# Patient Record
Sex: MALE | Race: BLACK OR AFRICAN AMERICAN | NOT HISPANIC OR LATINO | URBAN - METROPOLITAN AREA
[De-identification: names, ages, dates, MRNs, and addresses within clinical notes are randomized per-mention and may not be internally consistent; named-entity substitution may affect disease eponyms.]

---

## 2018-08-28 ENCOUNTER — EMERGENCY (EMERGENCY)
Facility: HOSPITAL | Age: 63
LOS: 0 days | Discharge: HOME | End: 2018-08-28
Attending: EMERGENCY MEDICINE | Admitting: EMERGENCY MEDICINE

## 2018-08-28 VITALS
RESPIRATION RATE: 18 BRPM | SYSTOLIC BLOOD PRESSURE: 141 MMHG | DIASTOLIC BLOOD PRESSURE: 90 MMHG | HEART RATE: 43 BPM | OXYGEN SATURATION: 98 %

## 2018-08-28 VITALS
OXYGEN SATURATION: 98 % | DIASTOLIC BLOOD PRESSURE: 97 MMHG | SYSTOLIC BLOOD PRESSURE: 174 MMHG | RESPIRATION RATE: 20 BRPM | HEART RATE: 56 BPM

## 2018-08-28 DIAGNOSIS — Z23 ENCOUNTER FOR IMMUNIZATION: ICD-10-CM
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
## 2018-08-28 DIAGNOSIS — S61.512A LACERATION WITHOUT FOREIGN BODY OF LEFT WRIST, INITIAL ENCOUNTER: ICD-10-CM
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
## 2018-08-28 DIAGNOSIS — S61.522A LACERATION WITH FOREIGN BODY OF LEFT WRIST, INITIAL ENCOUNTER: ICD-10-CM
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
## 2018-08-28 LAB
ALBUMIN SERPL ELPH-MCNC: 4.5 G/DL — SIGNIFICANT CHANGE UP (ref 3.5–5.2)
ALP SERPL-CCNC: 67 U/L — SIGNIFICANT CHANGE UP (ref 30–115)
ALT FLD-CCNC: 11 U/L — SIGNIFICANT CHANGE UP (ref 0–41)
ANION GAP SERPL CALC-SCNC: 16 MMOL/L — HIGH (ref 7–14)
APTT BLD: 37.2 SEC — SIGNIFICANT CHANGE UP (ref 27–39.2)
AST SERPL-CCNC: 18 U/L — SIGNIFICANT CHANGE UP (ref 0–41)
BASOPHILS # BLD AUTO: 0.07 K/UL — SIGNIFICANT CHANGE UP (ref 0–0.2)
BASOPHILS NFR BLD AUTO: 1.1 % — HIGH (ref 0–1)
BILIRUB SERPL-MCNC: 0.3 MG/DL — SIGNIFICANT CHANGE UP (ref 0.2–1.2)
BUN SERPL-MCNC: 18 MG/DL — SIGNIFICANT CHANGE UP (ref 10–20)
CALCIUM SERPL-MCNC: 9.4 MG/DL — SIGNIFICANT CHANGE UP (ref 8.5–10.1)
CHLORIDE SERPL-SCNC: 99 MMOL/L — SIGNIFICANT CHANGE UP (ref 98–110)
CO2 SERPL-SCNC: 25 MMOL/L — SIGNIFICANT CHANGE UP (ref 17–32)
CREAT SERPL-MCNC: 1.4 MG/DL — SIGNIFICANT CHANGE UP (ref 0.7–1.5)
EOSINOPHIL # BLD AUTO: 0.18 K/UL — SIGNIFICANT CHANGE UP (ref 0–0.7)
EOSINOPHIL NFR BLD AUTO: 2.9 % — SIGNIFICANT CHANGE UP (ref 0–8)
ETHANOL SERPL-MCNC: <10 MG/DL — HIGH
GLUCOSE SERPL-MCNC: 117 MG/DL — HIGH (ref 70–99)
HCT VFR BLD CALC: 37.6 % — LOW (ref 42–52)
HGB BLD-MCNC: 12.5 G/DL — LOW (ref 14–18)
IMM GRANULOCYTES NFR BLD AUTO: 0.3 % — SIGNIFICANT CHANGE UP (ref 0.1–0.3)
INR BLD: 1.06 RATIO — SIGNIFICANT CHANGE UP (ref 0.65–1.3)
LACTATE SERPL-SCNC: 1.3 MMOL/L — SIGNIFICANT CHANGE UP (ref 0.5–2.2)
LIDOCAIN IGE QN: 17 U/L — SIGNIFICANT CHANGE UP (ref 7–60)
LYMPHOCYTES # BLD AUTO: 2.64 K/UL — SIGNIFICANT CHANGE UP (ref 1.2–3.4)
LYMPHOCYTES # BLD AUTO: 42.3 % — SIGNIFICANT CHANGE UP (ref 20.5–51.1)
MCHC RBC-ENTMCNC: 29.5 PG — SIGNIFICANT CHANGE UP (ref 27–31)
MCHC RBC-ENTMCNC: 33.2 G/DL — SIGNIFICANT CHANGE UP (ref 32–37)
MCV RBC AUTO: 88.7 FL — SIGNIFICANT CHANGE UP (ref 80–94)
MONOCYTES # BLD AUTO: 0.47 K/UL — SIGNIFICANT CHANGE UP (ref 0.1–0.6)
MONOCYTES NFR BLD AUTO: 7.5 % — SIGNIFICANT CHANGE UP (ref 1.7–9.3)
NEUTROPHILS # BLD AUTO: 2.86 K/UL — SIGNIFICANT CHANGE UP (ref 1.4–6.5)
NEUTROPHILS NFR BLD AUTO: 45.9 % — SIGNIFICANT CHANGE UP (ref 42.2–75.2)
PLATELET # BLD AUTO: 228 K/UL — SIGNIFICANT CHANGE UP (ref 130–400)
POTASSIUM SERPL-MCNC: 4.2 MMOL/L — SIGNIFICANT CHANGE UP (ref 3.5–5)
POTASSIUM SERPL-SCNC: 4.2 MMOL/L — SIGNIFICANT CHANGE UP (ref 3.5–5)
PROT SERPL-MCNC: 7.2 G/DL — SIGNIFICANT CHANGE UP (ref 6–8)
PROTHROM AB SERPL-ACNC: 11.4 SEC — SIGNIFICANT CHANGE UP (ref 9.95–12.87)
RBC # BLD: 4.24 M/UL — LOW (ref 4.7–6.1)
RBC # FLD: 12.9 % — SIGNIFICANT CHANGE UP (ref 11.5–14.5)
SODIUM SERPL-SCNC: 140 MMOL/L — SIGNIFICANT CHANGE UP (ref 135–146)
TYPE + AB SCN PNL BLD: SIGNIFICANT CHANGE UP
WBC # BLD: 6.24 K/UL — SIGNIFICANT CHANGE UP (ref 4.8–10.8)
WBC # FLD AUTO: 6.24 K/UL — SIGNIFICANT CHANGE UP (ref 4.8–10.8)

## 2018-08-28 RX ORDER — SODIUM CHLORIDE 9 MG/ML
1000 INJECTION INTRAMUSCULAR; INTRAVENOUS; SUBCUTANEOUS ONCE
Qty: 0 | Refills: 0 | Status: COMPLETED | OUTPATIENT
Start: 2018-08-28 | End: 2018-08-28

## 2018-08-28 RX ORDER — TETANUS TOXOID, REDUCED DIPHTHERIA TOXOID AND ACELLULAR PERTUSSIS VACCINE, ADSORBED 5; 2.5; 8; 8; 2.5 [IU]/.5ML; [IU]/.5ML; UG/.5ML; UG/.5ML; UG/.5ML
0.5 SUSPENSION INTRAMUSCULAR ONCE
Qty: 0 | Refills: 0 | Status: COMPLETED | OUTPATIENT
Start: 2018-08-28 | End: 2018-08-28

## 2018-08-28 RX ORDER — MORPHINE SULFATE 50 MG/1
4 CAPSULE, EXTENDED RELEASE ORAL ONCE
Qty: 0 | Refills: 0 | Status: DISCONTINUED | OUTPATIENT
Start: 2018-08-28 | End: 2018-08-28

## 2018-08-28 RX ORDER — CEPHALEXIN 500 MG
1 CAPSULE ORAL
Qty: 40 | Refills: 0 | OUTPATIENT
Start: 2018-08-28 | End: 2018-09-06

## 2018-08-28 RX ORDER — CEFAZOLIN SODIUM 1 G
1000 VIAL (EA) INJECTION ONCE
Qty: 0 | Refills: 0 | Status: COMPLETED | OUTPATIENT
Start: 2018-08-28 | End: 2018-08-28

## 2018-08-28 RX ADMIN — Medication 100 MILLIGRAM(S): at 12:10

## 2018-08-28 RX ADMIN — TETANUS TOXOID, REDUCED DIPHTHERIA TOXOID AND ACELLULAR PERTUSSIS VACCINE, ADSORBED 0.5 MILLILITER(S): 5; 2.5; 8; 8; 2.5 SUSPENSION INTRAMUSCULAR at 12:38

## 2018-08-28 RX ADMIN — MORPHINE SULFATE 4 MILLIGRAM(S): 50 CAPSULE, EXTENDED RELEASE ORAL at 12:00

## 2018-08-28 RX ADMIN — SODIUM CHLORIDE 1000 MILLILITER(S): 9 INJECTION INTRAMUSCULAR; INTRAVENOUS; SUBCUTANEOUS at 12:31

## 2018-08-28 NOTE — H&P ADULT - NSHPLABSRESULTS_GEN_ALL_CORE
12.5   6.24  )-----------( 228      ( 08-28 @ 11:53 )             37.6               RADIOLOGY:   PENDING FINAL READS  XRAY CHEST  XRAY LEFT WRIST   XRAY LEFT FOREARM   XRAY LEFT HAND Labs:  CAPILLARY BLOOD GLUCOSE                              12.5   6.24  )-----------( 228      ( 28 Aug 2018 11:53 )             37.6       Auto Neutrophil %: 45.9 % (08-28-18 @ 11:53)  Auto Immature Granulocyte %: 0.3 % (08-28-18 @ 11:53)    08-28    140  |  99  |  18  ----------------------------<  117<H>  4.2   |  25  |  1.4      Calcium, Total Serum: 9.4 mg/dL (08-28-18 @ 11:53)      LFTs:             7.2  | 0.3  | 18       ------------------[67      ( 28 Aug 2018 11:53 )  4.5  | x    | 11          Lipase:17     Amylase:x         Lactate, Blood: 1.3 mmol/L (08-28-18 @ 11:53)      Coags:     11.40  ----< 1.06    ( 28 Aug 2018 11:53 )     37.2                        RADIOLOGY:   PENDING FINAL READS  XRAY CHEST  XRAY LEFT WRIST   XRAY LEFT FOREARM   XRAY LEFT HAND

## 2018-08-28 NOTE — H&P ADULT - HISTORY OF PRESENT ILLNESS
63M, no significant PMHx, presents to ED with a LUE laceration. Pt states he was moving a glass window when it fell and shattered on him. Pt sustained a LUE laceration located in the left wrist, with unknown status if glass is in the wound. Pt complains of pain to the left wrist, with active bleeding from the area. Pt denies any other complaints. No other trauma to the body. Pt denies any numbness, weakness, paresthesias to the left arm. No other complaints.     PSH: Umbilical hernia repair, Metal Rods in left forearm.

## 2018-08-28 NOTE — H&P ADULT - NSHPPHYSICALEXAM_GEN_ALL_CORE
PHYSICAL EXAM:  GENERAL: A&O, NAD, GCS 15  HEENT: Normocephalic, atraumatic  BACK: No stepoffs, No tenderness   CHEST/LUNG: Bilateral breath sounds  HEART: Regular rate and rhythm  ABDOMEN: Soft, Nondistended, Nontender, Pelvis stable  EXTREMITIES: 3-4cm laceration to the left wrist, pulses intact, ROM intact but pain with motion, sensation intact. Other extremities within normal limits. PHYSICAL EXAM:  GENERAL: A&O, NAD, GCS 15  HEENT: Normocephalic, atraumatic  BACK: No stepoffs, No tenderness   CHEST/LUNG: Bilateral breath sounds  HEART: Regular rate and rhythm  ABDOMEN: Soft, Nondistended, Nontender, Pelvis stable  EXTREMITIES: 3-4cm laceration to the left wrist, pulses intact, full ROM intact in all fingers, sensation intact in all fingers, no pulsatile bleeding   Other extremities within normal limits.

## 2018-08-28 NOTE — ED PROVIDER NOTE - PHYSICAL EXAMINATION
Well appearing NAD non toxic. NCAT EOMI conjunctiva nml. No nasal discharge. MMM. Neck supple, non tender, full ROM. RRR no MRG +S1S2. CTA b/l. Abd s NT ND +BS. Ext WWP x4, moving all extremities, no edema. 2+ equal pulses throughout. Cooperative, appropriate. LUE radial laceration approx 1.5cm, ?FB, actively bleeding w pressure tourniquet down, <2s cap refill throughout, +radial and ulnar pulses

## 2018-08-28 NOTE — ED PROVIDER NOTE - OBJECTIVE STATEMENT
64yo M no sig pmh pw LUE laceration while moving a glass window, shattered on him, sustained LUE laceration <5min PTA- no numbness/weakness, no other complaints or injuries

## 2018-08-28 NOTE — ED ADULT NURSE NOTE - OBJECTIVE STATEMENT
Pt a&ox4, speaking coherently, presents to ER with laceration and bleeding to left wrist after taking apart glass window Pt a&ox4, speaking coherently, presents to ER with laceration and bleeding to left wrist after taking apart glass window. Code Trauma Activated.

## 2018-08-28 NOTE — ED ADULT NURSE NOTE - NSIMPLEMENTINTERV_GEN_ALL_ED
Implemented All Universal Safety Interventions:  [place] to call system. Call bell, personal items and telephone within reach. Instruct patient to call for assistance. Room bathroom lighting operational. Non-slip footwear when patient is off stretcher. Physically safe environment: no spills, clutter or unnecessary equipment. Stretcher in lowest position, wheels locked, appropriate side rails in place.

## 2018-08-28 NOTE — ED PROVIDER NOTE - ATTENDING CONTRIBUTION TO CARE
63 y.o. male comes in with laceration to left wrist. Pt was taking out a window when glass broke and fell on his wrist. Comes in with bleeding and pain to wrist. On exam, pt in NAD, AAOx3, head NC/AT, CN II-XII intact, neck (-) midline tenderness, lungs CTA B/L, CV S1S2 regular, abdomen soft/NT/ND/(+)BS, ext (+) 3cm V shaped laceration to left wrist with active bleeding, pulses intact, pain on ROM of digits but able to move, good cap refill, sensation intact, (+) small abrasions to right hand. Code trauma called on arrival. Tourniquet applied. Will order labs, XR, ancef, tetanus, pain meds and reevaluate.

## 2018-08-28 NOTE — H&P ADULT - ASSESSMENT
63M, no significant PMH, presents s/p laceration to left wrist from moving glass which shattered and fell on him. 3-4cm laceration to left wrist. Wound was explored at bedside and glass was taken out. Laceration was sutured at bedside.   - F/U Xray chest, left forearm, left hand, left wrist final reads   - F/U labs.   - observe   - pain medications   - Tetanus vaccine 63M, no significant PMH, presents s/p laceration to left wrist from moving glass which shattered and fell on him. 3-4cm laceration to left wrist. Wound was explored at bedside and glass was taken out. Laceration was sutured at bedside.   - F/U Xray chest, left forearm, left hand, left wrist final reads   - F/U labs.   - observe   - pain medications   - Tetanus vaccine     Discharge instructions: Follow up in office in 5 days with the surgical service to take out sutures in left wrist. 63M, no significant PMH, presents s/p laceration to left wrist from moving glass which shattered and fell on him. 3-4cm laceration to left wrist. Wound was explored at bedside and glass shard was removed, copious washout. Laceration was sutured at bedside with 4 3-0 nylon interuppted sutures .   - F/U Xray chest, left forearm, left hand, left wrist final reads   - F/U labs.   - observe   - pain medications   - Tetanus vaccine     Discharge instructions: Follow up in office in 5 days with the surgical service to take out sutures in left wrist. keflex for 5 days     Senior Trauma Resident Note  Airway intact  Bilateral Breath Sounds  Palpable pulses in 4 ext  GCS 15, PERRL, CHRISTOPHER  VSS  No Subq emphysema, abdominal tenderness,  or pelvic instability   CXR negative  Ct findings above  Will Dispo accordingly  Plan as above d/w Dr Jarad Uribe

## 2022-01-28 NOTE — H&P ADULT - NSHPPOADEEPVENOUSTHROMB_GEN_A_CORE
Report given to Johana [address] at [facility] AND [facility]. All questions answered to satisfaction. no